# Patient Record
Sex: FEMALE | Race: WHITE | NOT HISPANIC OR LATINO | Employment: OTHER | URBAN - METROPOLITAN AREA
[De-identification: names, ages, dates, MRNs, and addresses within clinical notes are randomized per-mention and may not be internally consistent; named-entity substitution may affect disease eponyms.]

---

## 2023-09-20 ENCOUNTER — HOSPITAL ENCOUNTER (OUTPATIENT)
Dept: DATA CONVERSION | Facility: HOSPITAL | Age: 73
End: 2023-09-21
Attending: ORTHOPAEDIC SURGERY | Admitting: ORTHOPAEDIC SURGERY
Payer: COMMERCIAL

## 2023-09-20 DIAGNOSIS — E11.9 TYPE 2 DIABETES MELLITUS WITHOUT COMPLICATIONS (MULTI): ICD-10-CM

## 2023-09-20 DIAGNOSIS — M17.12 UNILATERAL PRIMARY OSTEOARTHRITIS, LEFT KNEE: ICD-10-CM

## 2023-09-20 DIAGNOSIS — Z86.718 PERSONAL HISTORY OF OTHER VENOUS THROMBOSIS AND EMBOLISM: ICD-10-CM

## 2023-09-20 DIAGNOSIS — E66.9 OBESITY, UNSPECIFIED: ICD-10-CM

## 2023-09-20 LAB
POCT GLUCOSE: 145 MG/DL (ref 74–99)
POCT GLUCOSE: 168 MG/DL (ref 74–99)
POCT GLUCOSE: 176 MG/DL (ref 74–99)

## 2023-09-21 LAB
ANION GAP IN SER/PLAS: 9 MMOL/L (ref 10–20)
BASOPHILS (10*3/UL) IN BLOOD BY AUTOMATED COUNT: 0.05 X10E9/L (ref 0–0.1)
BASOPHILS/100 LEUKOCYTES IN BLOOD BY AUTOMATED COUNT: 0.7 % (ref 0–2)
CALCIUM (MG/DL) IN SER/PLAS: 8.3 MG/DL (ref 8.6–10.3)
CARBON DIOXIDE, TOTAL (MMOL/L) IN SER/PLAS: 32 MMOL/L (ref 21–32)
CHLORIDE (MMOL/L) IN SER/PLAS: 102 MMOL/L (ref 98–107)
CREATININE (MG/DL) IN SER/PLAS: 0.74 MG/DL (ref 0.5–1.05)
EOSINOPHILS (10*3/UL) IN BLOOD BY AUTOMATED COUNT: 0.16 X10E9/L (ref 0–0.4)
EOSINOPHILS/100 LEUKOCYTES IN BLOOD BY AUTOMATED COUNT: 2.2 % (ref 0–6)
ERYTHROCYTE DISTRIBUTION WIDTH (RATIO) BY AUTOMATED COUNT: 13.3 % (ref 11.5–14.5)
ERYTHROCYTE MEAN CORPUSCULAR HEMOGLOBIN CONCENTRATION (G/DL) BY AUTOMATED: 30.8 G/DL (ref 32–36)
ERYTHROCYTE MEAN CORPUSCULAR VOLUME (FL) BY AUTOMATED COUNT: 91 FL (ref 80–100)
ERYTHROCYTES (10*6/UL) IN BLOOD BY AUTOMATED COUNT: 4.69 X10E12/L (ref 4–5.2)
GFR FEMALE: 86 ML/MIN/1.73M2
GLUCOSE (MG/DL) IN SER/PLAS: 240 MG/DL (ref 74–99)
HEMATOCRIT (%) IN BLOOD BY AUTOMATED COUNT: 42.8 % (ref 36–46)
HEMOGLOBIN (G/DL) IN BLOOD: 13.2 G/DL (ref 12–16)
IMMATURE GRANULOCYTES/100 LEUKOCYTES IN BLOOD BY AUTOMATED COUNT: 0.3 % (ref 0–0.9)
LEUKOCYTES (10*3/UL) IN BLOOD BY AUTOMATED COUNT: 7.3 X10E9/L (ref 4.4–11.3)
LYMPHOCYTES (10*3/UL) IN BLOOD BY AUTOMATED COUNT: 1.36 X10E9/L (ref 0.8–3)
LYMPHOCYTES/100 LEUKOCYTES IN BLOOD BY AUTOMATED COUNT: 18.6 % (ref 13–44)
MONOCYTES (10*3/UL) IN BLOOD BY AUTOMATED COUNT: 0.5 X10E9/L (ref 0.05–0.8)
MONOCYTES/100 LEUKOCYTES IN BLOOD BY AUTOMATED COUNT: 6.8 % (ref 2–10)
NEUTROPHILS (10*3/UL) IN BLOOD BY AUTOMATED COUNT: 5.24 X10E9/L (ref 1.6–5.5)
NEUTROPHILS/100 LEUKOCYTES IN BLOOD BY AUTOMATED COUNT: 71.4 % (ref 40–80)
NRBC (PER 100 WBCS) BY AUTOMATED COUNT: 0 /100 WBC (ref 0–0)
PLATELETS (10*3/UL) IN BLOOD AUTOMATED COUNT: 259 X10E9/L (ref 150–450)
POCT GLUCOSE: 217 MG/DL (ref 74–99)
POCT GLUCOSE: 255 MG/DL (ref 74–99)
POCT GLUCOSE: 271 MG/DL (ref 74–99)
POTASSIUM (MMOL/L) IN SER/PLAS: 4.5 MMOL/L (ref 3.5–5.3)
SODIUM (MMOL/L) IN SER/PLAS: 138 MMOL/L (ref 136–145)
UREA NITROGEN (MG/DL) IN SER/PLAS: 14 MG/DL (ref 6–23)

## 2023-09-22 LAB
ANION GAP IN SER/PLAS: NORMAL
BASOPHILS (10*3/UL) IN BLOOD BY AUTOMATED COUNT: NORMAL
BASOPHILS/100 LEUKOCYTES IN BLOOD BY AUTOMATED COUNT: NORMAL
CALCIUM (MG/DL) IN SER/PLAS: NORMAL
CARBON DIOXIDE, TOTAL (MMOL/L) IN SER/PLAS: NORMAL
CHLORIDE (MMOL/L) IN SER/PLAS: NORMAL
CREATININE (MG/DL) IN SER/PLAS: NORMAL
EOSINOPHILS (10*3/UL) IN BLOOD BY AUTOMATED COUNT: NORMAL
EOSINOPHILS/100 LEUKOCYTES IN BLOOD BY AUTOMATED COUNT: NORMAL
ERYTHROCYTE DISTRIBUTION WIDTH (RATIO) BY AUTOMATED COUNT: NORMAL
ERYTHROCYTE MEAN CORPUSCULAR HEMOGLOBIN CONCENTRATION (G/DL) BY AUTOMATED: NORMAL
ERYTHROCYTE MEAN CORPUSCULAR VOLUME (FL) BY AUTOMATED COUNT: NORMAL
ERYTHROCYTES (10*6/UL) IN BLOOD BY AUTOMATED COUNT: NORMAL
GFR FEMALE: NORMAL
GFR MALE: NORMAL
GLUCOSE (MG/DL) IN SER/PLAS: NORMAL
HEMATOCRIT (%) IN BLOOD BY AUTOMATED COUNT: NORMAL
HEMOGLOBIN (G/DL) IN BLOOD: NORMAL
IMMATURE GRANULOCYTES/100 LEUKOCYTES IN BLOOD BY AUTOMATED COUNT: NORMAL
LEUKOCYTES (10*3/UL) IN BLOOD BY AUTOMATED COUNT: NORMAL
LYMPHOCYTES (10*3/UL) IN BLOOD BY AUTOMATED COUNT: NORMAL
LYMPHOCYTES/100 LEUKOCYTES IN BLOOD BY AUTOMATED COUNT: NORMAL
MANUAL DIFFERENTIAL Y/N: NORMAL
MONOCYTES (10*3/UL) IN BLOOD BY AUTOMATED COUNT: NORMAL
MONOCYTES/100 LEUKOCYTES IN BLOOD BY AUTOMATED COUNT: NORMAL
NEUTROPHILS (10*3/UL) IN BLOOD BY AUTOMATED COUNT: NORMAL
NEUTROPHILS/100 LEUKOCYTES IN BLOOD BY AUTOMATED COUNT: NORMAL
NRBC (PER 100 WBCS) BY AUTOMATED COUNT: NORMAL
PLATELETS (10*3/UL) IN BLOOD AUTOMATED COUNT: NORMAL
POTASSIUM (MMOL/L) IN SER/PLAS: NORMAL
SODIUM (MMOL/L) IN SER/PLAS: NORMAL
UREA NITROGEN (MG/DL) IN SER/PLAS: NORMAL

## 2023-09-28 LAB
COMPLETE PATHOLOGY REPORT: NORMAL
CONVERTED CLINICAL DIAGNOSIS-HISTORY: NORMAL
CONVERTED FINAL DIAGNOSIS: NORMAL
CONVERTED FINAL REPORT PDF LINK TO COPY AND PASTE: NORMAL
CONVERTED GROSS DESCRIPTION: NORMAL

## 2023-09-29 VITALS — WEIGHT: 249.34 LBS | BODY MASS INDEX: 41.54 KG/M2 | HEIGHT: 65 IN

## 2023-09-30 NOTE — H&P
History & Physical Reviewed:   I have reviewed the History and Physical dated:  14-Sep-2023   History and Physical reviewed and relevant findings noted. Patient examined to review pertinent physical  findings.: No significant changes   Home Medications Reviewed: no changes noted   Allergies Reviewed: no changes noted     Consent:   COVID-19 Consent:  ·  COVID-19 Risk Consent Surgeon has reviewed key risks related to the risk of sofia COVID-19 and if they contract COVID-19 what the risks are.       Electronic Signatures:  Daron Garcia)  (Signed 20-Sep-2023 07:23)   Authored: History & Physical Reviewed, Consent, Note  Completion      Last Updated: 20-Sep-2023 07:23 by Daron Garcia)

## 2023-09-30 NOTE — PROGRESS NOTES
Service: Orthopaedics     Subjective Data:   YESSENIA TAYLOR is a 73 year old Female who is  POD #1 for  LEFT TOTAL KNEE ARTHROPLASTY.     Patient c/o moderate amount of incisional pain   Patient denies any c/o nausea tolerating regular diet well.    Objective Data:     Objective Information:        T   P  R  BP   MAP  SpO2   Value  36.3  86  16  148/64   92  95%  Date/Time 9/21 3:34 9/21 3:34 9/21 3:34 9/21 3:34  9/21 3:34 9/21 3:34  Range  (35.9C - 36.5C )  (77 - 87 )  (16 - 18 )  (118 - 152 )/ (58 - 68 )  (80 - 98 )  (93% - 95% )    Physical Exam by System:    Extremities: Aquacel dressing D&I to left knee incision.   Patient with slight dorsiflex weakness to left ankle.  Minimal amount of edema present to left knee     Recent Lab Results:    Results:        I have reviewed these laboratory results:    Glucose_POCT  Trending View      Result 21-Sep-2023 06:39:00  20-Sep-2023 16:06:00  20-Sep-2023 10:00:00    Glucose-POCT 217   H   168   H   176   H          Assessment and Plan:   Comorbidities:  ·  Comorbidity obesity   ·  Obesity morbid obesity (BMI 40+)   ·  BMI 41.49     Code Status:  ·  Code Status Full Code     Assessment:    POD #1  Patient afebrile  AM lab results pending.  Will monitor.  Encouraged use of IS Q1 WA       Impression 1: S/P TOTAL KNEE REPLACEMENT   Plan for Impression 1: Continue therapy PT/OT with  100% WBS  Plan discharge home today if safe & medically stable with [facility] followup   Impression 2: DVT PROPHYLAXIS   Plan for Impression 2: Continue lovenox injections  Bilateral SCDs to be worn while patient in bed  Encouraged ankle pumps Q1 Wa  Increase patient's activity as tolerated  Will discuss home going anticoagulation options with Dr Garcia d/t cost   Impression 3: INCISIONAL PAIN   Plan for Impression 3: Continue routine tylenol &  tramadol with ice therapy   Encouraged use of PRN oxycodone for breakthrough pain   Impression 4: DIABETES   Plan for Impression 4: Patient advanced to  regular  diabetic diet  Will continue to monitor patient's blood sugar levels  will resume patient's home dB meds upon discharge       Electronic Signatures:  Lala Castellon (RUEL-CNS)  (Signed 21-Sep-2023 07:41)   Authored: Service, Subjective Data, Objective Data, Assessment  and Plan, Note Completion      Last Updated: 21-Sep-2023 07:41 by Lala Castellon (APRN-CNS)

## 2023-10-01 PROBLEM — M17.12 PRIMARY OSTEOARTHRITIS OF LEFT KNEE: Status: ACTIVE | Noted: 2023-10-01

## 2023-10-01 PROBLEM — Z96.652 STATUS POST TOTAL LEFT KNEE REPLACEMENT: Status: ACTIVE | Noted: 2023-10-01

## 2023-10-01 PROBLEM — M25.562 KNEE PAIN, LEFT: Status: ACTIVE | Noted: 2023-10-01

## 2023-10-01 RX ORDER — DULOXETIN HYDROCHLORIDE 60 MG/1
60 CAPSULE, DELAYED RELEASE ORAL DAILY
COMMUNITY
Start: 2023-09-14

## 2023-10-01 RX ORDER — METFORMIN HYDROCHLORIDE 500 MG/1
TABLET ORAL
COMMUNITY
Start: 2023-07-14

## 2023-10-01 RX ORDER — MELOXICAM 7.5 MG/1
TABLET ORAL
COMMUNITY
Start: 2023-02-06

## 2023-10-01 RX ORDER — BLOOD-GLUCOSE METER
EACH MISCELLANEOUS
COMMUNITY
Start: 2023-09-08

## 2023-10-01 RX ORDER — GLIPIZIDE 5 MG/1
10 TABLET ORAL
COMMUNITY
Start: 2023-01-20

## 2023-10-01 RX ORDER — MELOXICAM 15 MG/1
TABLET ORAL
COMMUNITY
Start: 2023-08-30

## 2023-10-01 RX ORDER — LOSARTAN POTASSIUM 25 MG/1
TABLET ORAL
COMMUNITY
Start: 2023-07-14

## 2023-10-01 RX ORDER — CITALOPRAM 40 MG/1
40 TABLET, FILM COATED ORAL DAILY
COMMUNITY
Start: 2022-10-21

## 2023-10-01 RX ORDER — HYDROCODONE BITARTRATE AND ACETAMINOPHEN 5; 325 MG/1; MG/1
1 TABLET ORAL DAILY PRN
COMMUNITY
Start: 2022-11-10

## 2023-10-01 RX ORDER — SIMVASTATIN 10 MG/1
TABLET, FILM COATED ORAL
COMMUNITY
Start: 2023-07-14

## 2023-10-01 NOTE — OP NOTE
PROCEDURE DETAILS    Preoperative Diagnosis:  Osteoarthritis of left knee, M17.12    Postoperative Diagnosis:  Osteoarthritis of left knee, M17.12    Surgeon: Daron Garcia  Resident/Fellow/Other Assistant: Venkat Juarez     Procedure:  1.  LEFT TOTAL KNEE ARTHROPLASTY    Anesthesia: Spinal, regional, sedation  Estimated Blood Loss: 50 ml  Findings: see op report         Operative Report:   Implants: Good triathlon CR femur size 3, size 3 tibia, 13 CS articular surface, 32 patella  Operative findings:  Severe degenerative joint disease    Operative Indications:  Failed multiple non-surgical modalities and great difficulty performing activities of daily living as indicated in pre-operative notes.     Operative Procedure:  Patient was met prior to surgery in pre-operative holding.  The appropriate extremity was marked and recent health status was reviewed and we found no contraindication to proceeding with the scheduled procedure.  We again reviewed the risks of infection,  wound issues, deep venous thrombosis, pulmonary embolism, medical complications including cardiac and pulmonary, neurologic and vascular injury and incomplete relief of pre-operative pain.    The patient then underwent regional anesthesia in pre-op holding.  The patient was transported to the operating room.  A thigh tourniquet was placed and a small bump on the buttock.  The patient was resting comfortably in a supine position with all avery  prominences well padded.  Sequential compression device was placed on the contralateral lower extremity.  An exam under anesthesia was performed to evaluate the patient´s range of motion and ligamentous integrity.    The patient was prepped and draped in the usual sterile fashion.  The leg was placed in a leg cardona after appropriate padding.  After prep, drape, antibiotic and time out, the leg was exsanguinated and the tourniquet inflated.  A longitudinal incision  was made over the anterior knee.   The dissection was carried out down through the skin and subcutaneous tissue.  A medial parapatellar arthrotomy was performed.  An effusion and synovitis was noted compatible with the grade IV changes of the articular  cartilage.  The fat pad was de-bulked, Foard´s line was marked and some of the deep medial collateral ligament was released from the tibia.  The ACL was resected. The knee was flexed up and medial and lateral retractors were placed to protect the  collateral ligaments and popliteus tendon.      Due to the degree of deformity and stiffness, the posterior cruciate ligament was resected.    A canal finder reamer was utilized to gain entry into the femur.  An intramedullary otis was placed by hand and set to 5 degrees of valgus for the distal femoral cut.  The cutting block was placed and the distal femoral cut was performed.   A sizing guide  was then placed and the femoral size was measured based on posterior referencing.  The 4 in 1 cutting block was placed set to  degrees of external rotation.  The rotation was confirmed based on the transepicondylar axis and Get´s line.  There  was mild hypoplasia of the posterior lateral condyle.     Once the cutting block was placed the cuts were performed: anterior, posterior and chamfer cuts.  There collaterals were protected and the bone was removed.  There was no notching of the femur.   Once the femoral cuts were complete, we turned our attention  to the tibia.  Retractors were placed medial, lateral and posteriorly.  An extramedullary alignment otis was used and the slope was set in accordance with the implant.  We measured 2 mm of resection from the lowest point on the tibia.  The tibial cut was  completed with care not to encroach posterior to the knee joint.    After the tibial cut was completed, we removed the remaining menisci.  Using a curved osteotome posterior osteophytes were carefully removed from the femur.  We then assessed the flexion and  extension gaps using trial spacer blocks.    The gaps appeared symmetric with a 11 mm spacer and we proceeded to placing trial implants.  The final poly was a 13 CS.    With symmetric gaps we progressed to placing trial implants.    A trial tibial component was placed with care to avoid overhang.  The rotation was set in line with the medial third of the tibial tubercle.  A trial femoral component was then placed, followed by a trial articular surface.  The knee was taken through  range of motion with the provisional components.  The flexion and extension gaps were evaluated, as well as the patellar tracking and mid-flexion stability.  The following soft tissue balancing, recuts, and/or modifications were required: Release of lateral  off proximal tibia    The patella was everted and ~9 mm of bone were removed from the thickest portion using a clamp/cutting guide.  The lug holes were drilled in the patella and femur.  The tibia was prepared with the drill and broach after confirmation of alignment using  a drop otis.   The sclerotic areas of the bone were drilled using a small drill bit.  The capsule around the knee was injected using a long acting local anesthetic.    The cement ([employer] simplex) was mixed in a vacuum sealed fashion while the bone was pulsatile lavaged.   The bone was dried and the implants were placed with a gentle posterior directed force and a clamp on the patella.  The excess cement was removed.    After the cement dried the gap balancing was reassessed prior to placing the final articular surface.  The proud cement mantle was removed using a small osteotome.  The knee was then copiously lavaged with sterile saline and Irrisept (0.05% chlorhexidine  gluconate).  The tourniquet was taken down and hemostasis was obtained using Bovie electrocautery and tranexamic acid (per protocol).  No significant bleeders were encountered and the usage of a drain was not felt to be necessary.    A layered closure  was performed using 1 Vicryl and 1 Stratafix in the retinacular layer and quadriceps tendon.  2-0 vicryl in the deep dermal layer and monofilament in the skin.  An adherent, water proof dressing was placed followed by Webril and an ace  bandage. All counts were reported as correct.  The patient was stable to the post anesthesia care unit.    I was present and participated in the entire procedure. The Physician Assistant participated in all critical elements of the procedure under my direct supervision. The surgical incision was closed by the PA under my indirect supervision. There were no  qualified residents available to assist.    Complications:  none  Estimated blood loss:  50 ml  Specimens:  None      The physician assistant was present for the entire case.  Given the nature of the procedure and disease process a skilled surgical assistant was  necessary for the case.  The assistant was necessary for retraction and helped directly facilitate completion of the surgery.  A certified scrub tech was at the back table managing instruments and supplies for the surgical procedure.  Note Recipients:   Daron Garcia MD - [phone] [PREFERRED]                        Attestation:   Note Completion   Attending Attestation I performed the procedure without a resident       Electronic Signatures for Addendum Section:   Daron Garcia) (Signed Addendum 20-Sep-2023 09:46)   Pain mixture injected into capsule:  morphine 0.8ml, 0.5% ropivacaine 20 ml, 1% lidocaine with epi 20ml, Ketorolac 1 ml     Electronic Signatures:  Daron Garcia)  (Signed 20-Sep-2023 09:41)   Authored: Post-Operative Note, Chart Review, Note Completion      Last Updated: 20-Sep-2023 09:46 by Daron Garcia)

## 2023-10-04 ENCOUNTER — TELEPHONE (OUTPATIENT)
Dept: ORTHOPEDIC SURGERY | Facility: CLINIC | Age: 73
End: 2023-10-04
Payer: COMMERCIAL

## 2023-10-04 ENCOUNTER — APPOINTMENT (OUTPATIENT)
Dept: PHYSICAL THERAPY | Facility: CLINIC | Age: 73
End: 2023-10-04
Payer: COMMERCIAL

## 2023-10-04 DIAGNOSIS — Z96.659 S/P TOTAL KNEE ARTHROPLASTY, UNSPECIFIED LATERALITY: Primary | ICD-10-CM

## 2023-10-16 ENCOUNTER — OFFICE VISIT (OUTPATIENT)
Dept: ORTHOPEDIC SURGERY | Facility: CLINIC | Age: 73
End: 2023-10-16
Payer: COMMERCIAL

## 2023-10-16 ENCOUNTER — ANCILLARY PROCEDURE (OUTPATIENT)
Dept: RADIOLOGY | Facility: CLINIC | Age: 73
End: 2023-10-16
Payer: COMMERCIAL

## 2023-10-16 DIAGNOSIS — Z96.652 PRESENCE OF ARTIFICIAL KNEE JOINT, LEFT: ICD-10-CM

## 2023-10-16 DIAGNOSIS — G89.18 POST-OPERATIVE PAIN: Primary | ICD-10-CM

## 2023-10-16 PROCEDURE — 1159F MED LIST DOCD IN RCRD: CPT | Performed by: ORTHOPAEDIC SURGERY

## 2023-10-16 PROCEDURE — 73560 X-RAY EXAM OF KNEE 1 OR 2: CPT | Mod: LEFT SIDE | Performed by: RADIOLOGY

## 2023-10-16 PROCEDURE — 99024 POSTOP FOLLOW-UP VISIT: CPT | Performed by: ORTHOPAEDIC SURGERY

## 2023-10-16 PROCEDURE — 73560 X-RAY EXAM OF KNEE 1 OR 2: CPT | Mod: LT,FY

## 2023-10-16 RX ORDER — OXYCODONE AND ACETAMINOPHEN 5; 325 MG/1; MG/1
1 TABLET ORAL EVERY 4 HOURS PRN
Qty: 28 TABLET | Refills: 0 | Status: SHIPPED | OUTPATIENT
Start: 2023-10-16 | End: 2023-10-23

## 2023-10-16 RX ORDER — ONDANSETRON 4 MG/1
4 TABLET, FILM COATED ORAL EVERY 8 HOURS PRN
Status: SHIPPED | OUTPATIENT
Start: 2023-10-16 | End: 2023-10-26

## 2023-10-16 NOTE — PROGRESS NOTES
History of Present Illness:  Patient returns today endorsing moderate pain.  Denies any numbness or tingling.  No calf pain, No chest pain or shortness of breath.    Physical Examination:  Mild swelling  Healthy incision, no erythema or drainage  ROM:  0-95  + Plantar/dorsiflexion  Negative Padmaja test    Imaging:  Appropriate position and alignment no evidence of implant failure     Assessment  Patient status post left total knee arthroplasty, doing well    Plan:  Discussed analgesics, encouraging non-opioid modalities.  Encouraged ice, rest.  I have personally reviewed the [place]AR[place] report for this patient. This report is scanned into  the electronic medical record. I have considered the risks of abuse, dependence, addiction, and diversion. They currently report a pain of 8.   Zofran Rx today.   Discussed importance of ROM/ formal physical therapy.  Reviewed dental prophylaxis.  Follow-up in 1 month for a motion check.    Venkat Juarez PA-C     In a face to face encounter, I evaluated the patient and performed a physical examination, discussed pertinent diagnostic studies if indicated and discussed diagnosis and management strategies with both the patient and physician assistant / nurse practitioner.  I reviewed the PA/NP's note and agree with the documented findings and plan of care.      Daron Garcia MD

## 2023-11-20 ENCOUNTER — OFFICE VISIT (OUTPATIENT)
Dept: ORTHOPEDIC SURGERY | Facility: CLINIC | Age: 73
End: 2023-11-20
Payer: COMMERCIAL

## 2023-11-20 DIAGNOSIS — Z96.652 PRESENCE OF ARTIFICIAL KNEE JOINT, LEFT: Primary | ICD-10-CM

## 2023-11-20 PROCEDURE — 1159F MED LIST DOCD IN RCRD: CPT | Performed by: ORTHOPAEDIC SURGERY

## 2023-11-20 PROCEDURE — 99024 POSTOP FOLLOW-UP VISIT: CPT | Performed by: ORTHOPAEDIC SURGERY

## 2023-11-20 NOTE — PROGRESS NOTES
History of Present Illness:  Patient returns today endorsing mild pain.  Patient notes improving functional status.  She states that overall her therapy went very well, and that she has been dealing with some social stressors at home.  She notes that she was not able to pay for most her physical therapy so she forewent this and chose home therapy, which she is doing very well.    Patient states that her daughter recently had a large stroke and has been living in a rehab facility.     Physical Examination:  Well healed incision   ROM: 0-110  No laxity to varus / valgus stress  + Plantar/dorsiflexion  Negative Padmaja test    Assessment:  Patient status post left total knee arthroplasty, doing well    Plan:  Discussed analgesics.  Reviewed range of motion, strength goals.  Discussed activities to avoid  Dental prophylaxis discussed.  Follow-up:  2 months     Venkat Juarez PA-C     In a face to face encounter, I evaluated the patient and performed a physical examination, discussed pertinent diagnostic studies if indicated and discussed diagnosis and management strategies with both the patient and physician assistant / nurse practitioner.  I reviewed the PA/NP's note and agree with the documented findings and plan of care.        Daron Garcia MD

## 2024-01-15 ENCOUNTER — APPOINTMENT (OUTPATIENT)
Dept: ORTHOPEDIC SURGERY | Facility: CLINIC | Age: 74
End: 2024-01-15
Payer: COMMERCIAL

## 2025-01-23 ENCOUNTER — HOSPITAL ENCOUNTER (OUTPATIENT)
Dept: RADIOLOGY | Facility: HOSPITAL | Age: 75
Discharge: HOME | End: 2025-01-23
Payer: MEDICARE

## 2025-01-23 VITALS — WEIGHT: 231 LBS | HEIGHT: 65 IN | BODY MASS INDEX: 38.49 KG/M2

## 2025-01-23 DIAGNOSIS — Z12.31 ENCOUNTER FOR SCREENING MAMMOGRAM FOR MALIGNANT NEOPLASM OF BREAST: ICD-10-CM

## 2025-01-23 PROCEDURE — 77067 SCR MAMMO BI INCL CAD: CPT

## 2025-01-28 ENCOUNTER — HOSPITAL ENCOUNTER (OUTPATIENT)
Dept: RADIOLOGY | Facility: EXTERNAL LOCATION | Age: 75
Discharge: HOME | End: 2025-01-28